# Patient Record
Sex: FEMALE | Race: WHITE | Employment: OTHER | URBAN - METROPOLITAN AREA
[De-identification: names, ages, dates, MRNs, and addresses within clinical notes are randomized per-mention and may not be internally consistent; named-entity substitution may affect disease eponyms.]

---

## 2021-11-04 ENCOUNTER — ANESTHESIA EVENT (OUTPATIENT)
Dept: ENDOSCOPY | Age: 86
End: 2021-11-04
Payer: MEDICARE

## 2021-11-05 ENCOUNTER — HOSPITAL ENCOUNTER (OUTPATIENT)
Age: 86
Setting detail: OUTPATIENT SURGERY
Discharge: HOME | End: 2021-11-05
Attending: INTERNAL MEDICINE | Admitting: INTERNAL MEDICINE
Payer: MEDICARE

## 2021-11-05 ENCOUNTER — ANESTHESIA (OUTPATIENT)
Dept: ENDOSCOPY | Age: 86
End: 2021-11-05
Payer: MEDICARE

## 2021-11-05 VITALS
RESPIRATION RATE: 14 BRPM | DIASTOLIC BLOOD PRESSURE: 57 MMHG | OXYGEN SATURATION: 99 % | SYSTOLIC BLOOD PRESSURE: 116 MMHG

## 2021-11-05 VITALS
TEMPERATURE: 97.4 F | HEIGHT: 62 IN | OXYGEN SATURATION: 99 % | SYSTOLIC BLOOD PRESSURE: 115 MMHG | RESPIRATION RATE: 14 BRPM | DIASTOLIC BLOOD PRESSURE: 84 MMHG | WEIGHT: 152 LBS | HEART RATE: 80 BPM | BODY MASS INDEX: 27.97 KG/M2

## 2021-11-05 PROCEDURE — 2709999900 HC NON-CHARGEABLE SUPPLY: Performed by: INTERNAL MEDICINE

## 2021-11-05 PROCEDURE — 3700000001 HC ADD 15 MINUTES (ANESTHESIA): Performed by: INTERNAL MEDICINE

## 2021-11-05 PROCEDURE — 2580000003 HC RX 258: Performed by: ANESTHESIOLOGY

## 2021-11-05 PROCEDURE — 7100000011 HC PHASE II RECOVERY - ADDTL 15 MIN: Performed by: INTERNAL MEDICINE

## 2021-11-05 PROCEDURE — 6360000002 HC RX W HCPCS: Performed by: NURSE ANESTHETIST, CERTIFIED REGISTERED

## 2021-11-05 PROCEDURE — 3700000000 HC ANESTHESIA ATTENDED CARE: Performed by: INTERNAL MEDICINE

## 2021-11-05 PROCEDURE — C1726 CATH, BAL DIL, NON-VASCULAR: HCPCS | Performed by: INTERNAL MEDICINE

## 2021-11-05 PROCEDURE — 7100000010 HC PHASE II RECOVERY - FIRST 15 MIN: Performed by: INTERNAL MEDICINE

## 2021-11-05 PROCEDURE — 3609017700 HC EGD DILATION GASTRIC/DUODENAL STRICTURE: Performed by: INTERNAL MEDICINE

## 2021-11-05 PROCEDURE — 2500000003 HC RX 250 WO HCPCS: Performed by: NURSE ANESTHETIST, CERTIFIED REGISTERED

## 2021-11-05 RX ORDER — AMOXICILLIN 500 MG/1
1 CAPSULE ORAL 2 TIMES DAILY
COMMUNITY
Start: 2021-09-17

## 2021-11-05 RX ORDER — SODIUM CHLORIDE 0.9 % (FLUSH) 0.9 %
10 SYRINGE (ML) INJECTION EVERY 12 HOURS SCHEDULED
Status: DISCONTINUED | OUTPATIENT
Start: 2021-11-05 | End: 2021-11-05 | Stop reason: HOSPADM

## 2021-11-05 RX ORDER — PROPOFOL 10 MG/ML
INJECTION, EMULSION INTRAVENOUS PRN
Status: DISCONTINUED | OUTPATIENT
Start: 2021-11-05 | End: 2021-11-05 | Stop reason: SDUPTHER

## 2021-11-05 RX ORDER — SODIUM CHLORIDE 9 MG/ML
INJECTION, SOLUTION INTRAVENOUS CONTINUOUS
Status: DISCONTINUED | OUTPATIENT
Start: 2021-11-05 | End: 2021-11-05 | Stop reason: HOSPADM

## 2021-11-05 RX ORDER — LIDOCAINE HCL/PF 100 MG/5ML
SYRINGE (ML) INJECTION PRN
Status: DISCONTINUED | OUTPATIENT
Start: 2021-11-05 | End: 2021-11-05 | Stop reason: SDUPTHER

## 2021-11-05 RX ORDER — PANTOPRAZOLE SODIUM 20 MG/1
1 TABLET, DELAYED RELEASE ORAL DAILY
COMMUNITY
Start: 2021-10-23

## 2021-11-05 RX ORDER — SODIUM CHLORIDE 9 MG/ML
25 INJECTION, SOLUTION INTRAVENOUS PRN
Status: DISCONTINUED | OUTPATIENT
Start: 2021-11-05 | End: 2021-11-05 | Stop reason: HOSPADM

## 2021-11-05 RX ORDER — SOLIFENACIN SUCCINATE 5 MG/1
5 TABLET, FILM COATED ORAL DAILY
COMMUNITY

## 2021-11-05 RX ORDER — SODIUM CHLORIDE, SODIUM LACTATE, POTASSIUM CHLORIDE, CALCIUM CHLORIDE 600; 310; 30; 20 MG/100ML; MG/100ML; MG/100ML; MG/100ML
INJECTION, SOLUTION INTRAVENOUS CONTINUOUS
Status: DISCONTINUED | OUTPATIENT
Start: 2021-11-05 | End: 2021-11-05 | Stop reason: HOSPADM

## 2021-11-05 RX ORDER — SODIUM CHLORIDE 0.9 % (FLUSH) 0.9 %
10 SYRINGE (ML) INJECTION PRN
Status: DISCONTINUED | OUTPATIENT
Start: 2021-11-05 | End: 2021-11-05 | Stop reason: HOSPADM

## 2021-11-05 RX ORDER — TRIMETHOPRIM 100 MG/1
100 TABLET ORAL DAILY
COMMUNITY
Start: 2021-09-02

## 2021-11-05 ASSESSMENT — PULMONARY FUNCTION TESTS
PIF_VALUE: 1
PIF_VALUE: 0
PIF_VALUE: 1
PIF_VALUE: 0
PIF_VALUE: 1
PIF_VALUE: 0
PIF_VALUE: 1
PIF_VALUE: 0
PIF_VALUE: 1
PIF_VALUE: 0
PIF_VALUE: 1
PIF_VALUE: 0
PIF_VALUE: 1
PIF_VALUE: 0
PIF_VALUE: 1
PIF_VALUE: 1

## 2021-11-05 ASSESSMENT — LIFESTYLE VARIABLES: SMOKING_STATUS: 0

## 2021-11-05 ASSESSMENT — PAIN - FUNCTIONAL ASSESSMENT
PAIN_FUNCTIONAL_ASSESSMENT: FACES
PAIN_FUNCTIONAL_ASSESSMENT: 0-10

## 2021-11-05 ASSESSMENT — ENCOUNTER SYMPTOMS: SHORTNESS OF BREATH: 0

## 2021-11-05 NOTE — ANESTHESIA PRE PROCEDURE
Department of Anesthesiology  Preprocedure Note       Name:  Clinton Montanez   Age:  80 y.o.  :  1933                                          MRN:  [id number]         Date:  2021      Surgeon: Noel Levi):  Soha Nolan    Procedure: Procedure(s):  ESOPHAGOGASTRODUODENOSCOPY    Medications prior to admission:   Prior to Admission medications    Medication Sig Start Date End Date Taking? Authorizing Provider   diclofenac (VOLTAREN) 50 MG EC tablet Take 1 tablet by mouth 2 times daily 5/28/15 6/27/15  Johnathan Kessler MD   diclofenac (VOLTAREN) 50 MG EC tablet Take 1 tablet by mouth 2 times daily 4/23/15 5/23/15  Yesika Ludwig MD   imipramine (TOFRANIL) 25 MG tablet Take 25 mg by mouth nightly. Historical Provider, MD   hydrochlorothiazide (HYDRODIURIL) 12.5 MG tablet Take 12.5 mg by mouth daily. Historical Provider, MD   oxybutynin (DITROPAN-XL) 5 MG CR tablet Take 5 mg by mouth daily. 10/1/14   Historical Provider, MD   losartan (COZAAR) 50 MG tablet Take 50 mg by mouth daily. 10/1/14   Historical Provider, MD   gabapentin (NEURONTIN) 300 MG capsule Take 1 capsule by mouth See Admin Instructions for 1 day. 1 po QD x 1 day/1 po BID x 1 day/1 po TID x 7days/2 po TID x 21 days 10/15/14 10/16/14  Manuel Gutierrez DO   polyethylene glycol [facility]) powder as needed. 14   Historical Provider, MD   HYDROcodone-acetaminophen ([facility]) 5-325 MG per tablet  14   Historical Provider, MD   citalopram (CELEXA) 20 MG tablet Take 20 mg by mouth daily. Historical Provider, MD   OMEPRAZOLE PO Take 20 mg by mouth daily. Historical Provider, MD   Levothyroxine Sodium 50 MCG CAPS Take 50 mcg by mouth nightly. Historical Provider, MD   Glucosamine-Chondroit-Vit C-Mn (GLUCOSAMINE 1500 COMPLEX PO) Take  by mouth. Historical Provider, MD   therapeutic multivitamin-minerals (THERAGRAN-M) tablet Take 1 tablet by mouth daily.     Historical Provider, MD       Current medications:    Current Facility-Administered Medications   Medication Dose Route Frequency Provider Last Rate Last Admin    sodium chloride flush 0.9 % injection 10 mL  10 mL IntraVENous 2 times per day Woo Caldwell, DO        sodium chloride flush 0.9 % injection 10 mL  10 mL IntraVENous PRN Woo Caldwell, DO        0.9 % sodium chloride infusion  25 mL IntraVENous PRN Woo Caldwell, DO        0.9 % sodium chloride infusion   IntraVENous Continuous Woo Caldwell, DO        lactated ringers infusion   IntraVENous Continuous Woo Caldwell,  mL/hr at 11/05/21 0704 New Bag at 11/05/21 0704       Allergies:     Allergies   Allergen Reactions    Neurontin [Gabapentin] Nausea Only    Codeine Rash       Problem List:    Patient Active Problem List   Diagnosis Code    Back pain M54.9    Lumbar stenosis M48.061    DDD (degenerative disc disease), lumbar M51.36    Lumbar scoliosis M41.9    OA (osteoarthritis) of hip M16.9    Lumbar vertebral fracture (HCC) S32.009A    Paresthesia of lower extremity R20.2    Stenosis, spinal, lumbar M48.061       Past Medical History:        Diagnosis Date    Arthritis     Degeneration of lumbar or lumbosacral intervertebral disc     Esophageal reflux     Fibromyalgia     Hypertension     Spinal stenosis, lumbar region, without neurogenic claudication     Thyroid disease        Past Surgical History:        Procedure Laterality Date    BREAST SURGERY      right and left biopsy    EYE SURGERY Bilateral     cataracts    HYSTERECTOMY      JOINT REPLACEMENT      Right Knee    JOINT REPLACEMENT      Left Knee    OTHER SURGICAL HISTORY  03/09/2015    IMPLANTABLE SPINAL CORD STIMULATOR TRIAL    OTHER SURGICAL HISTORY  03/13/2015    SPINAL CORD STIMULATOR GENERATOR INSERTION    SKIN CANCER EXCISION      TONSILLECTOMY         Social History:    Social History     Tobacco Use    Smoking status: Never Smoker    Smokeless tobacco: Never Used   Substance Use Topics    Alcohol use: No     Alcohol/week: 0.0 standard drinks                                Counseling given: Not Answered      Vital Signs (Current):   Vitals:    11/05/21 0650 11/05/21 0700   BP: (!) 147/71    Pulse: 104 94   Resp: 16    Temp: 97.4 °F (36.3 °C)    TempSrc: Temporal    SpO2: 98%    Weight: 152 lb (68.9 kg)    Height: 5' 2\" (1.575 m)                                               BP Readings from Last 3 Encounters:   11/05/21 (!) 147/71   05/28/15 100/76   04/23/15 96/59       NPO Status:                                                                                 BMI:   Wt Readings from Last 3 Encounters:   11/05/21 152 lb (68.9 kg)   03/16/15 180 lb (81.6 kg)   03/13/15 180 lb (81.6 kg)     Body mass index is 27.8 kg/m². CBC: No results found for: WBC, RBC, HGB, HCT, MCV, RDW, PLT    CMP: No results found for: NA, K, CL, CO2, BUN, CREATININE, GFRAA, AGRATIO, LABGLOM, GLUCOSE, PROT, CALCIUM, BILITOT, ALKPHOS, AST, ALT    POC Tests: No results for input(s): POCGLU, POCNA, POCK, POCCL, POCBUN, POCHEMO, POCHCT in the last 72 hours.     Coags: No results found for: PROTIME, INR, APTT    HCG (If Applicable): No results found for: PREGTESTUR, PREGSERUM, HCG, HCGQUANT     ABGs: No results found for: PHART, PO2ART, RZU7JKB, GGL3OWG, BEART, A5SRMXSO     Type & Screen (If Applicable):  No results found for: LABABO, LABRH    Drug/Infectious Status (If Applicable):  No results found for: HIV, HEPCAB    COVID-19 Screening (If Applicable): No results found for: COVID19        Anesthesia Evaluation    Airway: Mallampati: II  TM distance: >3 FB   Neck ROM: full  Mouth opening: > = 3 FB Dental:          Pulmonary:       (-) asthma, shortness of breath and not a current smoker                           Cardiovascular:  Exercise tolerance: poor (<4 METS),   (+) hypertension:,     (-) past MI and  angina                Neuro/Psych:   (+) neuromuscular disease:,    (-) seizures           GI/Hepatic/Renal:   (+) GERD:,           Endo/Other:        (-) diabetes mellitus               Abdominal:             Vascular: Other Findings:           Anesthesia Plan      MAC     ASA 3     (-npo MN  -uses walker and wheelchair due to leg pain. Denies sob)  Induction: intravenous. Anesthetic plan and risks discussed with patient. Plan discussed with CRNA.                 Smith Thomas MD   11/5/2021

## 2021-11-05 NOTE — H&P
Gastroenterology Outpatient History and Physical     Patient: Shivani Olvera MRN: [id number] Sex: female   YOB: 1933 Age: 80 y.o. Location: [address] [place] [address]    Date:11/5/2021  Primary Care Physician: Karla Muñoz         Patient: Shivani Olvera    Physician: Robert President,     History of Present Illness: Dysphagia, hx of prior esophageal stricture dilation. Review of Systems:  Weight Loss: No  Dysphagia: No  Dyspepsia: No  History:  Past Medical History:   Diagnosis Date    Arthritis     Degeneration of lumbar or lumbosacral intervertebral disc     Esophageal reflux     Fibromyalgia     Hypertension     Spinal stenosis, lumbar region, without neurogenic claudication     Thyroid disease       Past Surgical History:   Procedure Laterality Date    BREAST SURGERY      right and left biopsy    EYE SURGERY Bilateral     cataracts    HYSTERECTOMY      JOINT REPLACEMENT      Right Knee    JOINT REPLACEMENT      Left Knee    OTHER SURGICAL HISTORY  03/09/2015    IMPLANTABLE SPINAL CORD STIMULATOR TRIAL    OTHER SURGICAL HISTORY  03/13/2015    SPINAL CORD STIMULATOR GENERATOR INSERTION    SKIN CANCER EXCISION      TONSILLECTOMY        Social History     Socioeconomic History    Marital status:       Spouse name: None    Number of children: None    Years of education: None    Highest education level: None   Occupational History    None   Tobacco Use    Smoking status: Never Smoker    Smokeless tobacco: Never Used   Vaping Use    Vaping Use: Never used   Substance and Sexual Activity    Alcohol use: No     Alcohol/week: 0.0 standard drinks    Drug use: No    Sexual activity: None   Other Topics Concern    None   Social History Narrative    None     Social Determinants of Health     Financial Resource Strain:     Difficulty of Paying Living Expenses:    Food Insecurity:     Worried About Running Out of Food in the Last Year:     Forrest josue Food in the Last Year:    Transportation Needs:     Lack of Transportation (Medical):  Lack of Transportation (Non-Medical):    Physical Activity:     Days of Exercise per Week:     Minutes of Exercise per Session:    Stress:     Feeling of Stress :    Social Connections:     Frequency of Communication with Friends and Family:     Frequency of Social Gatherings with Friends and Family:     Attends Yarsani Services:     Active Member of Clubs or Organizations:     Attends Club or Organization Meetings:     Marital Status:    Intimate Partner Violence:     Fear of Current or Ex-Partner:     Emotionally Abused:     Physically Abused:     Sexually Abused:       History reviewed. No pertinent family history. Allergies: Allergies   Allergen Reactions    Neurontin [Gabapentin] Nausea Only    Codeine Rash     Medications:   Prior to Admission medications    Medication Sig Start Date End Date Taking? Authorizing Provider   amoxicillin (AMOXIL) 500 MG capsule Take 1 capsule by mouth 2 times daily 9/17/21  Yes Historical Provider, MD   Cranberry 1000 MG CAPS Take 1 capsule by mouth daily   Yes Historical Provider, MD   pantoprazole (PROTONIX) 20 MG tablet Take 1 tablet by mouth daily 10/23/21  Yes Historical Provider, MD   solifenacin (VESICARE) 5 MG tablet Take 5 mg by mouth daily   Yes Historical Provider, MD   trimethoprim (TRIMPEX) 100 MG tablet Take 100 mg by mouth daily 9/2/21  Yes Historical Provider, MD   VITAMIN D PO Take by mouth daily   Yes Historical Provider, MD   Ferrous Sulfate (IRON PO) Take by mouth three times a week   Yes Historical Provider, MD   imipramine (TOFRANIL) 10 MG tablet Take 10 mg by mouth nightly    Yes Historical Provider, MD   losartan (COZAAR) 100 MG tablet Take 100 mg by mouth daily  10/1/14  Yes Historical Provider, MD   citalopram (CELEXA) 20 MG tablet Take 20 mg by mouth daily.    Yes Historical Provider, MD   Levothyroxine Sodium 50 MCG CAPS Take 50 mcg by mouth nightly. Yes Historical Provider, MD   therapeutic multivitamin-minerals (THERAGRAN-M) tablet Take 1 tablet by mouth daily. Yes Historical Provider, MD       Vital Signs: BP (!) 147/71   Pulse 94   Temp 97.4 °F (36.3 °C) (Temporal)   Resp 16   Ht 5' 2\" (1.575 m)   Wt 152 lb (68.9 kg)   SpO2 98%   BMI 27.80 kg/m²   Physical Exam:   Heart: regular   Lungs: non-labored breathing  Mental status:  Alert and oriented    ASA score:  ASA 2 - Patient with mild systemic disease with no functional limitations{  Mallimpati score:  2     Planned Procedure: EGD WITH DILATION    Planned Sedation: Conscious sedation / Monitored anesthesia.     Signed By: Abel Speaker,    November 5, 2021

## 2021-11-05 NOTE — PROCEDURES
EGD PROCEDURE NOTE         Esophagogastroduodenoscopy Procedure Note     Patient: Eleanor Herrera MRN: [id number]   YOB: 1933 Age: 80 y.o. Sex: female   Unit: [facility] ENDOSCOPY Room/Bed: Endo Pool/NONE Location: [address] [place] [address]    Admitting Physician: Jan Ruvalcaba     Primary Care Physician: Janice Sepulveda      DATE OF PROCEDURE: 11/5/2021  PROCEDURE: Esophagogastroduodenoscopy  INDICATION: This is a 80y.o. year old female who presents today intermittent dysphagia. ENDOSCOPIST: Martha Mauricio,     POSTOPERATIVE DIAGNOSIS:  Dysphagia, hx of esophageal stricture. PLAN:  EGD WITH DILATION. INFORMED CONSENT:  Informed consent for esophagogastoduodenoscopy was obtained. The benefits and risks including adverse medicine reaction have been explained. The patient's questions were answered and the patient agreed to proceed. ASA:  ASA 2 - Patient with mild systemic disease with no functional limitations    SEDATION: MAC     The patient's vital signs, cardiac status, pulmonary status, abdominal status and mental status were stable for the procedure. The patient's vitals signs and respiratory function as monitored by oxygen saturation were stable throughout    Procedure Details: The Olympus videoendoscope was inserted into the mouth and carefully passed into the esophagus, through the stomach and to the distal duodenum. Antegrade and retrograde examination of the upper gi tract was carefully performed. Findings:   1. The GEJ was located at 36 cm, no erosive esophagitis. 2.  The z-line is distinct without lesions or irregularities. There is no evidence of Tyler's esophagus. 3. A 4 cm sliding hiatal hernia noted, slightly tortuous esophagus, a schatzki ring noted at the GEJ. Distal esophagus dilated to 20 mmusing a CRE balloon, minimal mucosal tearing observed post dilation. 5.  The scope easily passed into the stomach.   The mucosa of the cardia, fundus, body and antrum of the stomach is normal.  The pylorus is patent and of normal contour. 6. The scope easily advanced into the duodenal bulb and down to the distal duodenum. Ante-and retrograde exam of the duodenum is normal.    Gastric or Duodenal ulcer present: No    Estimated Blood Loss: None     Plan-  1. Follow GERD lifestyle modifications  2. Repeat dilation as needed in future  3.  Use Pantoprazole 40 mg daily 1/2 hr before       Signed By: Eligio Alfaro,

## 2021-11-05 NOTE — ANESTHESIA POSTPROCEDURE EVALUATION
Department of Anesthesiology  Postprocedure Note    Patient: Eleanor Herrera  MRN: [id number]  YOB: 1933  Date of evaluation: 11/5/2021  Time:  9:33 AM     Procedure Summary     Date: 11/05/21 Room / Location: [address]    Anesthesia Start: 0740 Anesthesia Stop: 8186    Procedure: EGD DILATION BALLOON (N/A ) Diagnosis:       Esophageal stricture      (Esophageal stricture [K22.2])    Surgeons: Cindi Cuellar Responsible Provider: Vaughn Easton MD    Anesthesia Type: MAC ASA Status: 3          Anesthesia Type: MAC    Gladys Phase I: Gladys Score: 10    Gladys Phase II: Gladys Score: 9    Last vitals: Reviewed and per EMR flowsheets.        Anesthesia Post Evaluation    Patient location during evaluation: bedside  Patient participation: complete - patient participated  Level of consciousness: awake  Pain score: 0  Airway patency: patent  Nausea & Vomiting: no nausea and no vomiting  Complications: no  Cardiovascular status: hemodynamically stable  Respiratory status: acceptable  Hydration status: euvolemic

## 2021-11-05 NOTE — PROGRESS NOTES
Ambulatory Surgery/Procedure Discharge Note    Patient tolerated procedure well. Patient denies nausea, cramping or pain post procedure. Discharge instructions and education reviewed with patient and Daughter. Written instructions provided at discharge. Patient discharged ambulatory in wheelchair to car. Daughter to drive pt home. Vitals:    11/05/21 0835   BP: 115/84   Pulse: 80   Resp: 14   Temp:    SpO2: 99%       No intake/output data recorded. Restroom use offered before discharge. Yes    Pain assessment:  none  Pain Level: 0        Patient discharged to home/self care.  Patient discharged via wheel chair by transporter to waiting family/S.O.       11/5/2021 0945 AM

## (undated) DEVICE — DILATOR ENDOSCP L180CM DIA6FR BLLN L8CM DIA54-60FR

## (undated) DEVICE — SYRINGE INFL 60ML DISP ALLIANCE II

## (undated) DEVICE — MASK CAPNOGRAPHY AD W35IN DIA58IN SAMP LN L10FT O2 LN